# Patient Record
Sex: FEMALE | NOT HISPANIC OR LATINO | Employment: OTHER | URBAN - NONMETROPOLITAN AREA
[De-identification: names, ages, dates, MRNs, and addresses within clinical notes are randomized per-mention and may not be internally consistent; named-entity substitution may affect disease eponyms.]

---

## 2023-07-10 ENCOUNTER — APPOINTMENT (OUTPATIENT)
Dept: GENERAL RADIOLOGY | Facility: HOSPITAL | Age: 78
End: 2023-07-10
Attending: PHYSICIAN ASSISTANT
Payer: MEDICARE

## 2023-07-10 ENCOUNTER — HOSPITAL ENCOUNTER (EMERGENCY)
Facility: HOSPITAL | Age: 78
Discharge: HOME | End: 2023-07-10
Attending: PHYSICIAN ASSISTANT | Admitting: PHYSICIAN ASSISTANT
Payer: MEDICARE

## 2023-07-10 VITALS
TEMPERATURE: 97.6 F | OXYGEN SATURATION: 96 % | HEART RATE: 77 BPM | RESPIRATION RATE: 16 BRPM | DIASTOLIC BLOOD PRESSURE: 74 MMHG | SYSTOLIC BLOOD PRESSURE: 132 MMHG

## 2023-07-10 DIAGNOSIS — M79.642 PAIN OF LEFT HAND: ICD-10-CM

## 2023-07-10 DIAGNOSIS — M25.532 LEFT WRIST PAIN: ICD-10-CM
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
## 2023-07-10 PROCEDURE — 96372 THER/PROPH/DIAG INJ SC/IM: CPT | Performed by: PHYSICIAN ASSISTANT

## 2023-07-10 PROCEDURE — 250N000011 HC RX IP 250 OP 636: Mod: JZ | Performed by: PHYSICIAN ASSISTANT

## 2023-07-10 PROCEDURE — G0463 HOSPITAL OUTPT CLINIC VISIT: HCPCS | Mod: 25

## 2023-07-10 PROCEDURE — 99213 OFFICE O/P EST LOW 20 MIN: CPT | Performed by: PHYSICIAN ASSISTANT

## 2023-07-10 PROCEDURE — 73130 X-RAY EXAM OF HAND: CPT | Mod: LT

## 2023-07-10 PROCEDURE — 73110 X-RAY EXAM OF WRIST: CPT | Mod: LT

## 2023-07-10 RX ORDER — KETOROLAC TROMETHAMINE 30 MG/ML
30 INJECTION, SOLUTION INTRAMUSCULAR; INTRAVENOUS ONCE
Status: COMPLETED | OUTPATIENT
Start: 2023-07-10 | End: 2023-07-10

## 2023-07-10 RX ORDER — GABAPENTIN 100 MG/1
600 CAPSULE ORAL 3 TIMES DAILY
COMMUNITY

## 2023-07-10 RX ORDER — ATORVASTATIN CALCIUM 40 MG/1
40 TABLET, FILM COATED ORAL DAILY
COMMUNITY

## 2023-07-10 RX ADMIN — KETOROLAC TROMETHAMINE 30 MG: 30 INJECTION, SOLUTION INTRAMUSCULAR; INTRAVENOUS at 17:08

## 2023-07-10 ASSESSMENT — ENCOUNTER SYMPTOMS
ARTHRALGIAS: 1
CARDIOVASCULAR NEGATIVE: 1
RESPIRATORY NEGATIVE: 1
NUMBNESS: 0
WOUND: 0
MYALGIAS: 1
CONSTITUTIONAL NEGATIVE: 1
WEAKNESS: 0

## 2023-07-10 ASSESSMENT — ACTIVITIES OF DAILY LIVING (ADL): ADLS_ACUITY_SCORE: 35

## 2023-07-10 NOTE — ED TRIAGE NOTES
Pt presents with c/o falling and landed on left hand   Pt does have noticeable swelling around the thumb   Limited ROM due to pain  CMS intact   Pt states having pain from the forearm down to the thumb  Denies any previous hx of fracture or injury   No known blood disorders  happened today   No otc meds taken      Triage Assessment     Row Name 07/10/23 4215       Triage Assessment (Adult)    Airway WDL WDL       Respiratory WDL    Respiratory WDL WDL       Skin Circulation/Temperature WDL    Skin Circulation/Temperature WDL WDL       Cardiac WDL    Cardiac WDL WDL       Peripheral/Neurovascular WDL    Peripheral Neurovascular WDL WDL       Cognitive/Neuro/Behavioral WDL    Cognitive/Neuro/Behavioral WDL WDL

## 2023-07-10 NOTE — DISCHARGE INSTRUCTIONS
Splint for 3-5 days, then may start gentle use. If it hurts, get back in the splint and recheck in 7-10 days.   Ice, tylenol, elevate  
normal

## 2023-07-11 NOTE — ED PROVIDER NOTES
History     Chief Complaint   Patient presents with     Wrist Pain     C/o lt wrist pain, notes tripped and fell earlier     HPI  Bradford Patel is a 77 year old female who presents with LT wrist/hand pain after fall PTA. She thinks she fell on outstretched hand, but it all happened so fast. Pain is worse with pronation/supination and location is in the wrist. She denies any additional injury. Fall was mechanical, no loss of consciousness.    Allergies:  Allergies   Allergen Reactions     Augmentin [Amoxicillin-Pot Clavulanate]      Bacitracin      Iodine        Problem List:    There are no problems to display for this patient.       Past Medical History:    History reviewed. No pertinent past medical history.    Past Surgical History:    History reviewed. No pertinent surgical history.    Family History:    History reviewed. No pertinent family history.    Social History:  Marital Status:  Unknown [6]        Medications:    atorvastatin (LIPITOR) 40 MG tablet  gabapentin (NEURONTIN) 100 MG capsule          Review of Systems   Constitutional: Negative.    Respiratory: Negative.    Cardiovascular: Negative.    Musculoskeletal: Positive for arthralgias and myalgias.   Skin: Negative for wound.   Neurological: Negative for weakness and numbness.       Physical Exam   BP: 132/74  Pulse: 77  Temp: 97.6  F (36.4  C)  Resp: 16  SpO2: 96 %      Physical Exam  Vitals and nursing note reviewed.   Constitutional:       General: She is not in acute distress.     Appearance: She is not toxic-appearing.   Cardiovascular:      Rate and Rhythm: Normal rate.   Pulmonary:      Effort: Pulmonary effort is normal.   Musculoskeletal:      Left elbow: No swelling. Normal range of motion. No tenderness.      Left forearm: No swelling, edema or tenderness (radial aspect wrist).      Left wrist: Tenderness and snuff box tenderness present. No swelling. Decreased range of motion (pain limits).      Left hand: Swelling (CMC/base of  "thumb, palmar surface) present. Decreased range of motion (painful to \"thumbs up\"). Normal strength.   Skin:     General: Skin is warm and dry.   Neurological:      Mental Status: She is alert and oriented to person, place, and time.         ED Course         Results for orders placed or performed during the hospital encounter of 07/10/23 (from the past 24 hour(s))   XR Wrist Left G/E 3 Views    Narrative    Exam: XR WRIST LEFT G/E 3 VIEWS     History:Female, age 77 years, fall, wrist pain    Comparison:  No relevant prior imaging.    Technique: Three views are submitted.    Findings: Bones are osteopenic. No evidence of acute or subacute  fracture.  No evidence of dislocation.           Impression    Impression:  No evidence of acute or subacute bony abnormality.     Generalized osteopenia.    Scattered degenerative changes, most severe at the first  carpometacarpal joint.    LISA MARX MD         SYSTEM ID:  [id number]   XR Hand Left G/E 3 Views    Narrative    Exam: XR HAND LEFT G/E 3 VIEWS     History:Female, age 77 years, fall, hand pain    Comparison:  No relevant prior imaging.    Technique: Three views are submitted.    Findings: Bones are osteopenic. No evidence of acute or subacute  fracture.  No evidence of dislocation.           Impression    Impression:  No evidence of acute or subacute bony abnormality.     Generalized osteopenia.    Scattered degenerative changes, most severe at the first  carpometacarpal joint with findings suggesting old ununited avulsion  fracture fragments or perhaps calcified joint bodies along the lateral  aspect of the wrist.    LISA MARX MD         SYSTEM ID:  [id number]       Medications   ketorolac (TORADOL) injection 30 mg (30 mg Intramuscular $Given 7/10/23 2082)       Assessments & Plan (with Medical Decision Making)     I have reviewed the nursing notes.  I have reviewed the findings, diagnosis, plan and need for follow up with the patient.    Discharge " Medication List as of 7/10/2023  6:18 PM          Final diagnoses:   Fall, initial encounter   Left wrist pain   Pain of left hand   No Fx on films, rad read confirms. I did place Bradford in a thumb spica. She will immobilize for 3-5 days, if ROM/pain persists I recommended recheck in 7-10 days. Back here with pain/swelling out of proportion.     7/10/2023   [facility]     Deshawn Leo PA  07/1945